# Patient Record
Sex: MALE | Race: OTHER | NOT HISPANIC OR LATINO | URBAN - METROPOLITAN AREA
[De-identification: names, ages, dates, MRNs, and addresses within clinical notes are randomized per-mention and may not be internally consistent; named-entity substitution may affect disease eponyms.]

---

## 2018-09-29 ENCOUNTER — EMERGENCY (EMERGENCY)
Facility: HOSPITAL | Age: 48
LOS: 1 days | Discharge: HOME | End: 2018-09-29
Attending: EMERGENCY MEDICINE
Payer: COMMERCIAL

## 2018-09-29 VITALS
RESPIRATION RATE: 16 BRPM | SYSTOLIC BLOOD PRESSURE: 123 MMHG | HEART RATE: 68 BPM | DIASTOLIC BLOOD PRESSURE: 76 MMHG | OXYGEN SATURATION: 100 %

## 2018-09-29 VITALS
TEMPERATURE: 98 F | HEART RATE: 63 BPM | DIASTOLIC BLOOD PRESSURE: 78 MMHG | OXYGEN SATURATION: 100 % | RESPIRATION RATE: 18 BRPM | SYSTOLIC BLOOD PRESSURE: 115 MMHG

## 2018-09-29 PROCEDURE — 73630 X-RAY EXAM OF FOOT: CPT | Mod: 26,RT

## 2018-09-29 PROCEDURE — 99284 EMERGENCY DEPT VISIT MOD MDM: CPT | Mod: 25

## 2018-09-29 PROCEDURE — 96372 THER/PROPH/DIAG INJ SC/IM: CPT | Mod: XU

## 2018-09-29 PROCEDURE — 28495 TREAT BIG TOE FRACTURE: CPT

## 2018-09-29 PROCEDURE — 73620 X-RAY EXAM OF FOOT: CPT

## 2018-09-29 PROCEDURE — 99284 EMERGENCY DEPT VISIT MOD MDM: CPT

## 2018-09-29 PROCEDURE — 73660 X-RAY EXAM OF TOE(S): CPT

## 2018-09-29 RX ORDER — MORPHINE SULFATE 50 MG/1
4 CAPSULE, EXTENDED RELEASE ORAL ONCE
Qty: 0 | Refills: 0 | Status: DISCONTINUED | OUTPATIENT
Start: 2018-09-29 | End: 2018-09-29

## 2018-09-29 RX ORDER — IBUPROFEN 200 MG
1 TABLET ORAL
Qty: 15 | Refills: 0 | OUTPATIENT
Start: 2018-09-29 | End: 2018-10-03

## 2018-09-29 RX ORDER — IBUPROFEN 200 MG
600 TABLET ORAL ONCE
Qty: 0 | Refills: 0 | Status: COMPLETED | OUTPATIENT
Start: 2018-09-29 | End: 2018-09-29

## 2018-09-29 RX ADMIN — Medication 600 MILLIGRAM(S): at 12:04

## 2018-09-29 RX ADMIN — MORPHINE SULFATE 4 MILLIGRAM(S): 50 CAPSULE, EXTENDED RELEASE ORAL at 12:46

## 2018-09-29 NOTE — ED ADULT NURSE NOTE - NSIMPLEMENTINTERV_GEN_ALL_ED
Implemented All Universal Safety Interventions:  [place] to call system. Call bell, personal items and telephone within reach. Instruct patient to call for assistance. Room bathroom lighting operational. Non-slip footwear when patient is off stretcher. Physically safe environment: no spills, clutter or unnecessary equipment. Stretcher in lowest position, wheels locked, appropriate side rails in place.

## 2018-09-29 NOTE — ED ADULT NURSE NOTE - OBJECTIVE STATEMENT
C/O INJURY , PAIN TO RIGHT BIG TOE. STATES HIS RIGHT BIG TOE WENT UNDERNEATH RIGHT FOOT WHILE TRYING TO MOVE FORWARD TO BREAK HIS FALL TODAY. C/O INJURY , PAIN TO RIGHT BIG TOE. STATES HIS RIGHT BIG TOE WENT UNDERNEATH RIGHT FOOT WHILE TRYING TO MOVE FORWARD TO BREAK HIS FALL TODAY.EVALUATED BY PODIATRY CONSULT, RIGHT FOOT BIG TOE ELODIA TAPING DONE, HARD SOLE SHOE APPLIED , CANE GIVEN FOR AMBULATION , INSTRUCTIONS AND RETURN DEMONSTRATION DONE.

## 2018-09-29 NOTE — ED STATDOCS - OBJECTIVE STATEMENT
Telemedicine assessment was conducted (using real time 2 way audio-video technology) by Dr. Kye Hodges located at [address] [place] [id number]  +++++++++++++++++++++++++++++++++++++++++++++++++++  History and Plan:   47 y/o M with no PMHx or PSHx of presents to ED c/o broken right big toe x today. Pt states he was playing with his son when it occurred. Pt endorses numbness, pain. Pt has not taken any medications for the pain. NKDA    Plan: X-ray, Intake.

## 2018-09-29 NOTE — ED PROVIDER NOTE - MEDICAL DECISION MAKING DETAILS
Pt with R great toe pain s/p traumatic incident x 1 hour PTA. Xray shows dislocation and fracture for R great toe. Will give Morphine and consult podiatry to perform splint and reduction.

## 2018-09-29 NOTE — ED PROVIDER NOTE - OBJECTIVE STATEMENT
47 y/o male with no significant PMHx presents to the ED c/o R great toe pain x 1 hour PTA. Pt notes he was walking at home when he stepped on the toy, jamming his toe into it and falling forward. Pt is able to ambulate but with a limp secondary to pain. Pt took Ibuprofen x 30 mins to minimal relief. Pt notes pain in ED. Pt denies numbness, tingling, or any other complaints. NKDA.

## 2018-09-29 NOTE — CONSULT NOTE ADULT - SUBJECTIVE AND OBJECTIVE BOX
Patient is a 48y old  Male who presents with a chief complaint of right great toe pain    HPI: 47 y/o male with no significant PMH presents to ED stating he hurt his right great toe this morning after tripping on a toy at him. Patient states he fell, but denies any LOC. Pt states he felt a pop on the right hallux, and admits to 10/10 pain on VAS. Pt states he took Ibuprofen for the pain, but had very little relief. Pt denies any current constitutional symptoms, such as F/V/N/C/SOB/ Pt denies any current burning, numbness, or tingling.       PMH:No pertinent past medical history    Allergies: No Known Allergies    Medications:   PSX: No significant past surgical history    SH:     Vital Signs Last 24 Hrs  T(C): 36.7 (29 Sep 2018 11:49), Max: 36.7 (29 Sep 2018 11:49)  T(F): 98.1 (29 Sep 2018 11:49), Max: 98.1 (29 Sep 2018 11:49)  HR: 63 (29 Sep 2018 11:49) (63 - 63)  BP: 115/78 (29 Sep 2018 11:49) (115/78 - 115/78)  BP(mean): --  RR: 18 (29 Sep 2018 11:49) (18 - 18)  SpO2: 100% (29 Sep 2018 11:49) (100% - 100%)    LABS                      ROS  REVIEW OF SYSTEMS:    CONSTITUTIONAL: No fever, weight loss, or fatigue  EYES: No eye pain, visual disturbances, or discharge  ENMT:  No difficulty hearing, tinnitus, vertigo; No sinus or throat pain  NECK: No pain or stiffness  BREASTS: No pain, masses, or nipple discharge  RESPIRATORY: No cough, wheezing, chills or hemoptysis; No shortness of breath  CARDIOVASCULAR: No chest pain, palpitations, dizziness, or leg swelling  GASTROINTESTINAL: No abdominal or epigastric pain. No nausea, vomiting, or hematemesis; No diarrhea or constipation. No melena or hematochezia.  GENITOURINARY: No dysuria, frequency, hematuria, or incontinence  NEUROLOGICAL: No headaches, memory loss, loss of strength, numbness, or tremors  SKIN: No itching, burning, rashes, or lesions   LYMPH NODES: No enlarged glands  ENDOCRINE: No heat or cold intolerance; No hair loss  MUSCULOSKELETAL: No joint pain or swelling; No muscle, back, or extremity pain  PSYCHIATRIC: No depression, anxiety, mood swings, or difficulty sleeping  HEME/LYMPH: No easy bruising, or bleeding gums  ALLERY AND IMMUNOLOGIC: No hives or eczema      PHYSICAL EXAM  GEN: ESPERANZA MILLER is a pleasant well-nourished, well developed 48y Male in no acute distress, alert awake, and oriented to person, place and time.   LE Focused: Right foot focused  Vasc:  DP/PT 2/4; CFT < 3 sec x 5; TG warm to cool; erythema and edema noted along the hallux on the right  Derm: No IDM, no open lesions, no clinical signs of infection; ecchymosis noted to the dorsum of the right hallux  Neuro: Protective sensation grossly intact  ACC: POP of the dorsal aspect of right hallux; ROM of the right halllux limited due to significant pain and swelling     Imaging:   Imaging pre - reduction - AP - oblique radioluscent lesion from medial distal to lateral proximal; on lateral the head of the proximal phalanx has dorsally dislocated and has medially dislocated seen on oblique  Post -reduction - the fracture fragment has not been put into place following closed reduction of the hallux - no dorsal dislocation seen on lateral    A:  Proximal phalanx fracture - Right hallux    P:  Patient evaluated and chart created  Imaging evaluated - see above   Instructed patient on the necessity of closed reduction due to the severity of the dislocation of the fracture fragment   Patient gave a verbal understanding   Procedure site was prepped with an alcohol swab  Patient given 8 ccs of 1% Lidocaine via a hallux block on the right using a sterile 25 gauge needle  Following local anesthesia, closed reduction was attempted on the right halllux  Pt tolerated procedure well with post reduction imaging ordered  Following post reduction imaging - see above - Right hallux was splinted, foot wrapped in an ACE, and patient given a surgical shoe   Pt instructed to rest, ice, compress, and elevate the extremity   Pt to follow up at [address] [place] [address] Thursday with Dr. Cline for evaluation of fracture   Discussed with attending Dr. Cline

## 2018-09-29 NOTE — ED PROVIDER NOTE - CARE PLAN
Principal Discharge DX:	Closed displaced fracture of proximal phalanx of right great toe, initial encounter

## 2018-10-09 ENCOUNTER — TRANSCRIPTION ENCOUNTER (OUTPATIENT)
Age: 48
End: 2018-10-09

## 2023-11-08 ENCOUNTER — TRANSCRIPTION ENCOUNTER (OUTPATIENT)
Age: 53
End: 2023-11-08

## 2025-01-30 ENCOUNTER — APPOINTMENT (OUTPATIENT)
Dept: ORTHOPEDIC SURGERY | Facility: CLINIC | Age: 55
End: 2025-01-30

## 2025-01-30 VITALS — WEIGHT: 160 LBS | BODY MASS INDEX: 25.11 KG/M2 | HEIGHT: 67 IN

## 2025-01-30 DIAGNOSIS — M19.012 PRIMARY OSTEOARTHRITIS, LEFT SHOULDER: ICD-10-CM

## 2025-01-30 DIAGNOSIS — M75.121 COMPLETE ROTATOR CUFF TEAR OR RUPTURE OF RIGHT SHOULDER, NOT SPECIFIED AS TRAUMATIC: ICD-10-CM

## 2025-01-30 DIAGNOSIS — M19.011 PRIMARY OSTEOARTHRITIS, RIGHT SHOULDER: ICD-10-CM

## 2025-01-30 DIAGNOSIS — M24.411 RECURRENT DISLOCATION, RIGHT SHOULDER: ICD-10-CM

## 2025-01-30 PROCEDURE — 73030 X-RAY EXAM OF SHOULDER: CPT | Mod: 50

## 2025-01-30 PROCEDURE — 99204 OFFICE O/P NEW MOD 45 MIN: CPT | Mod: 25

## 2025-01-30 RX ORDER — MELOXICAM 15 MG/1
15 TABLET ORAL
Qty: 30 | Refills: 1 | Status: ACTIVE | COMMUNITY
Start: 2025-01-30 | End: 1900-01-01

## 2025-02-13 ENCOUNTER — APPOINTMENT (OUTPATIENT)
Dept: MRI IMAGING | Facility: CLINIC | Age: 55
End: 2025-02-13

## 2025-02-21 ENCOUNTER — APPOINTMENT (OUTPATIENT)
Dept: MRI IMAGING | Facility: CLINIC | Age: 55
End: 2025-02-21

## 2025-02-27 ENCOUNTER — APPOINTMENT (OUTPATIENT)
Dept: ORTHOPEDIC SURGERY | Facility: CLINIC | Age: 55
End: 2025-02-27